# Patient Record
Sex: MALE | Race: WHITE | NOT HISPANIC OR LATINO | Employment: OTHER | URBAN - METROPOLITAN AREA
[De-identification: names, ages, dates, MRNs, and addresses within clinical notes are randomized per-mention and may not be internally consistent; named-entity substitution may affect disease eponyms.]

---

## 2023-03-07 ASSESSMENT — RHEUMATOLOGY NEW PATIENT QUESTIONNAIRE
BLOODY CONSTIPATION: N
VISION LOSS: N
DIFFICULTY SWALLOWING: N
TEMPLE PAIN: N
NECK PAIN: Y
SINONASAL CONGESTION: N
ALLEVIATING_FACTORS: NOT MUCH
SUNLIGHT-INDUCED SKIN RASH: LEGS
DIZZINESS: Y
VERTIGO: Y
RINGING IN EARS: Y
SKIN PLAQUES: Y
NUMBNESS: Y
HEARTBURN: Y
DRY MOUTH: N
ACHILLES TENDON PAIN: N
MORNING STIFFNESS: <30 MINS
GOITER: N
NAUSEA: Y
COLD-INDUCED COLOR CHANGES (WHITE, PURPLE, RED ON REWARMING): FINGERS
HEARING LOSS: Y
CHEST PAIN WITH BREATHING: N
BODY ACHES: Y
COUGH WITH BLOODY PHLEGM: N
RATE_1TO10: 5
MARK ALL THE AREAS OF PAIN: 83231792
GENITAL ULCERS: N
NOSEBLEEDS: Y
EYE PAIN: Y
HEADACHES: Y
BLOODY DIARRHEA: N
FROTHY URINE: N
IRREGULAR BEATS: N
BLURRINESS: Y
NIGHT SWEATS: Y
MUSCLE PAIN: Y
JOINT SWELLING: N
PELVIC PAIN: Y
CHILLS: N
MUSCLE WEAKNESS: Y
DRY COUGH: N
INEFFECTIVE_MEDICATIONS: NAPROXEN
ANXIETY: Y
DRY EYES: Y
SUNLIGHT-INDUCED SKIN RASH: ARMS
FEVERS: N
NASAL ULCERS: N
DEPRESSION: N
TINGLING: Y
JOINT PAIN: BETTER WITH ACTIVITY
SNORING: Y
BLOOD IN URINE: N
BLEEDING GUMS: N
VOMITING: Y
BURNING URINATION: N
PALPITATIONS: N
SINUS PAIN: N
SUNLIGHT-INDUCED SKIN RASH: FACE
EAR PAIN: N
EYE REDNESS: Y
ABDOMINAL PAIN: Y
SORE THROAT: N
ABNORMAL DISCHARGE: N
ORAL ULCERS: N
CAVITIES: N
DIFFICULTY SPEAKING: Y
SHORTNESS OF BREATH: N
SWOLLEN GLANDS: AROUND NECK
THYROID PAIN: N
SPASMS: Y

## 2023-03-10 ENCOUNTER — OFFICE VISIT (OUTPATIENT)
Dept: RHEUMATOLOGY | Facility: MEDICAL CENTER | Age: 63
End: 2023-03-10
Attending: STUDENT IN AN ORGANIZED HEALTH CARE EDUCATION/TRAINING PROGRAM
Payer: COMMERCIAL

## 2023-03-10 ENCOUNTER — HOSPITAL ENCOUNTER (OUTPATIENT)
Dept: RADIOLOGY | Facility: MEDICAL CENTER | Age: 63
End: 2023-03-10
Attending: STUDENT IN AN ORGANIZED HEALTH CARE EDUCATION/TRAINING PROGRAM
Payer: COMMERCIAL

## 2023-03-10 ENCOUNTER — HOSPITAL ENCOUNTER (OUTPATIENT)
Dept: LAB | Facility: MEDICAL CENTER | Age: 63
End: 2023-03-10
Attending: STUDENT IN AN ORGANIZED HEALTH CARE EDUCATION/TRAINING PROGRAM
Payer: COMMERCIAL

## 2023-03-10 VITALS
HEART RATE: 81 BPM | BODY MASS INDEX: 30.7 KG/M2 | WEIGHT: 207.25 LBS | DIASTOLIC BLOOD PRESSURE: 84 MMHG | TEMPERATURE: 99.6 F | SYSTOLIC BLOOD PRESSURE: 158 MMHG | HEIGHT: 69 IN | OXYGEN SATURATION: 97 %

## 2023-03-10 DIAGNOSIS — E83.52 HYPERCALCEMIA DUE TO SARCOIDOSIS: ICD-10-CM

## 2023-03-10 DIAGNOSIS — D86.9 HYPERCALCEMIA DUE TO SARCOIDOSIS: ICD-10-CM

## 2023-03-10 DIAGNOSIS — M25.50 ARTHRALGIA OF MULTIPLE JOINTS: ICD-10-CM

## 2023-03-10 DIAGNOSIS — R76.8 SEROLOGIC AUTOIMMUNITY: ICD-10-CM

## 2023-03-10 DIAGNOSIS — D86.9 SARCOIDOSIS, UNSPECIFIED: ICD-10-CM

## 2023-03-10 DIAGNOSIS — R76.8 LOW SERUM IGG FOR AGE: ICD-10-CM

## 2023-03-10 LAB
C3 SERPL-MCNC: 125.1 MG/DL (ref 87–200)
C4 SERPL-MCNC: 22.2 MG/DL (ref 19–52)

## 2023-03-10 PROCEDURE — 36415 COLL VENOUS BLD VENIPUNCTURE: CPT

## 2023-03-10 PROCEDURE — 86334 IMMUNOFIX E-PHORESIS SERUM: CPT

## 2023-03-10 PROCEDURE — 99212 OFFICE O/P EST SF 10 MIN: CPT | Performed by: STUDENT IN AN ORGANIZED HEALTH CARE EDUCATION/TRAINING PROGRAM

## 2023-03-10 PROCEDURE — 77077 JOINT SURVEY SINGLE VIEW: CPT

## 2023-03-10 PROCEDURE — 82784 ASSAY IGA/IGD/IGG/IGM EACH: CPT

## 2023-03-10 PROCEDURE — 82652 VIT D 1 25-DIHYDROXY: CPT

## 2023-03-10 PROCEDURE — 84165 PROTEIN E-PHORESIS SERUM: CPT

## 2023-03-10 PROCEDURE — 99417 PROLNG OP E/M EACH 15 MIN: CPT | Performed by: STUDENT IN AN ORGANIZED HEALTH CARE EDUCATION/TRAINING PROGRAM

## 2023-03-10 PROCEDURE — 86038 ANTINUCLEAR ANTIBODIES: CPT

## 2023-03-10 PROCEDURE — 84155 ASSAY OF PROTEIN SERUM: CPT

## 2023-03-10 PROCEDURE — 83516 IMMUNOASSAY NONANTIBODY: CPT | Mod: 91

## 2023-03-10 PROCEDURE — 99205 OFFICE O/P NEW HI 60 MIN: CPT | Performed by: STUDENT IN AN ORGANIZED HEALTH CARE EDUCATION/TRAINING PROGRAM

## 2023-03-10 PROCEDURE — 73070 X-RAY EXAM OF ELBOW: CPT | Mod: LT

## 2023-03-10 PROCEDURE — 86160 COMPLEMENT ANTIGEN: CPT

## 2023-03-10 PROCEDURE — 73565 X-RAY EXAM OF KNEES: CPT

## 2023-03-10 RX ORDER — ATORVASTATIN CALCIUM 80 MG/1
TABLET, FILM COATED ORAL
COMMUNITY
Start: 2012-01-06 | End: 2024-03-12

## 2023-03-10 RX ORDER — PREDNISONE 10 MG/1
TABLET ORAL
Qty: 35 TABLET | Refills: 0 | Status: SHIPPED | OUTPATIENT
Start: 2023-03-10 | End: 2023-04-07

## 2023-03-10 RX ORDER — BETAMETHASONE DIPROPIONATE 0.5 MG/G
CREAM TOPICAL
COMMUNITY
Start: 2021-03-06

## 2023-03-10 RX ORDER — NAPROXEN 500 MG/1
500 TABLET ORAL 2 TIMES DAILY WITH MEALS
COMMUNITY
Start: 2023-02-08 | End: 2024-03-12

## 2023-03-10 RX ORDER — QUETIAPINE FUMARATE 50 MG/1
TABLET, FILM COATED ORAL
COMMUNITY
Start: 2012-01-06

## 2023-03-10 RX ORDER — LOSARTAN POTASSIUM AND HYDROCHLOROTHIAZIDE 12.5; 5 MG/1; MG/1
TABLET ORAL
COMMUNITY
Start: 2023-02-06

## 2023-03-10 RX ORDER — AMITRIPTYLINE HYDROCHLORIDE 25 MG/1
TABLET, FILM COATED ORAL
COMMUNITY
Start: 2023-03-06 | End: 2023-03-20

## 2023-03-10 NOTE — PATIENT INSTRUCTIONS
AFTER VISIT INSTRUCTIONS    Below are important information to help you navigate your healthcare needs and help us serve you safely and effectively:  If laboratory tests and/or imaging studies were ordered, remember to go get them done as instructed.  If new prescriptions and/or refills were sent, remember to go pick them up from your local pharmacy, or call the specialty pharmacy to request shipment.  Always take your prescription medications exactly as prescribed unless instructed otherwise.  Note that antirheumatic drugs and steroids are immunosuppressive which means they increase your risk of infections and have multiple potential adverse effects on various organ systems in your body, though most of them are uncommon.  It is important that you are up-to-date on age-appropriate immunizations, particularly shingles and bacterial/viral pneumonia vaccines, which you can request from me or your primary care provider.  Be sure to read the drug package inserts to learn about the potential side effects of your medications before you start taking them.  If you experience any significant drug side effects, stop taking the medication and notify me promptly, and depending on the severity of the side effects, consider going to an urgent care or emergency department for immediate attention.  If there are significant findings on your lab tests and imaging studies that warrant further action, I will notify you with explanations via Continuumhart or phone call, otherwise you can view them on Dropifi and let me know if you have any questions.  Note that Dropifi messages are typically read during office hours and may take 1-7 business days before a response depending on the urgency of the situation and how busy my clinic schedule is.  In general, Dropifi messaging is for non-urgent matters that do not require immediate attention, so for urgent matters that cannot wait, you are advised to go to an urgent care.  Lastly, you are granted  MyChart access to my documentation of your visit and are encouraged to read my note which details my assessment and plan for your condition.
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
## 2023-03-11 PROBLEM — R76.8 SEROLOGIC AUTOIMMUNITY: Status: ACTIVE | Noted: 2023-03-11

## 2023-03-11 PROBLEM — M25.50 ARTHRALGIA OF MULTIPLE JOINTS: Status: ACTIVE | Noted: 2023-03-11

## 2023-03-11 PROBLEM — D86.9 HYPERCALCEMIA DUE TO SARCOIDOSIS: Status: ACTIVE | Noted: 2023-03-11

## 2023-03-11 PROBLEM — E83.52 HYPERCALCEMIA DUE TO SARCOIDOSIS: Status: ACTIVE | Noted: 2023-03-11

## 2023-03-11 PROBLEM — D86.9 SARCOIDOSIS, UNSPECIFIED: Status: ACTIVE | Noted: 2023-03-11

## 2023-03-12 LAB — NUCLEAR IGG SER QL IA: NORMAL

## 2023-03-13 LAB
1,25(OH)2D3 SERPL-MCNC: 50.6 PG/ML (ref 19.9–79.3)
CHROMATIN IGG SERPL-ACNC: 12 UNITS (ref 0–19)
HISTONE IGG SER IA-ACNC: 1.8 UNITS (ref 0–0.9)

## 2023-03-15 LAB
ALBUMIN SERPL ELPH-MCNC: 4.71 G/DL (ref 3.75–5.01)
ALPHA1 GLOB SERPL ELPH-MCNC: 0.28 G/DL (ref 0.19–0.46)
ALPHA2 GLOB SERPL ELPH-MCNC: 0.84 G/DL (ref 0.48–1.05)
B-GLOBULIN SERPL ELPH-MCNC: 0.73 G/DL (ref 0.48–1.1)
GAMMA GLOB SERPL ELPH-MCNC: 0.53 G/DL (ref 0.62–1.51)
IGA SERPL-MCNC: 155 MG/DL (ref 68–408)
IGG SERPL-MCNC: 512 MG/DL (ref 768–1632)
IGM SERPL-MCNC: 30 MG/DL (ref 35–263)
INTERPRETATION SERPL IFE-IMP: ABNORMAL
MONOCLON BAND OBS SERPL: ABNORMAL
MONOCLONAL PROTEIN NL11656: ABNORMAL G/DL
PATHOLOGY STUDY: ABNORMAL
PROT SERPL-MCNC: 7.1 G/DL (ref 6.3–8.2)

## 2023-03-20 ENCOUNTER — OFFICE VISIT (OUTPATIENT)
Dept: RHEUMATOLOGY | Facility: MEDICAL CENTER | Age: 63
End: 2023-03-20
Attending: STUDENT IN AN ORGANIZED HEALTH CARE EDUCATION/TRAINING PROGRAM
Payer: COMMERCIAL

## 2023-03-20 VITALS
SYSTOLIC BLOOD PRESSURE: 154 MMHG | WEIGHT: 209 LBS | HEART RATE: 79 BPM | BODY MASS INDEX: 30.96 KG/M2 | TEMPERATURE: 97.4 F | DIASTOLIC BLOOD PRESSURE: 90 MMHG | HEIGHT: 69 IN | OXYGEN SATURATION: 99 %

## 2023-03-20 DIAGNOSIS — D86.89 SARCOIDOSIS OF DIGESTIVE SYSTEM: ICD-10-CM

## 2023-03-20 DIAGNOSIS — M15.3 POST-TRAUMATIC OSTEOARTHRITIS OF MULTIPLE JOINTS: ICD-10-CM

## 2023-03-20 DIAGNOSIS — D84.9 IMMUNOSUPPRESSED STATUS (HCC): ICD-10-CM

## 2023-03-20 PROCEDURE — 99212 OFFICE O/P EST SF 10 MIN: CPT | Performed by: STUDENT IN AN ORGANIZED HEALTH CARE EDUCATION/TRAINING PROGRAM

## 2023-03-20 PROCEDURE — 99214 OFFICE O/P EST MOD 30 MIN: CPT | Performed by: STUDENT IN AN ORGANIZED HEALTH CARE EDUCATION/TRAINING PROGRAM

## 2023-03-20 RX ORDER — ZOSTER VACCINE RECOMBINANT, ADJUVANTED 50 MCG/0.5
KIT INTRAMUSCULAR
Qty: 0.5 ML | Refills: 1 | Status: SHIPPED | OUTPATIENT
Start: 2023-03-20

## 2023-03-20 RX ORDER — FOLIC ACID 1 MG/1
TABLET ORAL
Qty: 72 TABLET | Refills: 3 | Status: SHIPPED | OUTPATIENT
Start: 2023-03-20 | End: 2023-06-01 | Stop reason: DRUGHIGH

## 2023-03-20 RX ORDER — AMITRIPTYLINE HYDROCHLORIDE 25 MG/1
TABLET, FILM COATED ORAL
COMMUNITY

## 2023-03-20 RX ORDER — NAPROXEN 500 MG/1
TABLET ORAL
COMMUNITY
End: 2023-03-20

## 2023-03-20 NOTE — PATIENT INSTRUCTIONS
AFTER VISIT INSTRUCTIONS    Below are important information to help you navigate your healthcare needs and help us serve you safely and effectively:  If laboratory tests and/or imaging studies were ordered, remember to go get them done as instructed.  If new prescriptions and/or refills were sent, remember to go pick them up from your local pharmacy, or call the specialty pharmacy to request shipment.  Always take your prescription medications exactly as prescribed unless instructed otherwise.  Note that antirheumatic drugs and steroids are immunosuppressive which means they increase your risk of infections and have multiple potential adverse effects on various organ systems in your body, though most of them are uncommon.  It is important that you are up-to-date on age-appropriate immunizations, particularly shingles and bacterial/viral pneumonia vaccines, which you can request from me or your primary care provider.  Be sure to read the drug package inserts to learn about the potential side effects of your medications before you start taking them.  If you experience any significant drug side effects, stop taking the medication and notify me promptly, and depending on the severity of the side effects, consider going to an urgent care or emergency department for immediate attention.  If there are significant findings on your lab tests and imaging studies that warrant further action, I will notify you with explanations via Vuv Analyticshart or phone call, otherwise you can view them on Swagapalooza and let me know if you have any questions.  Note that Sw[employer]apalooza messages are typically read during office hours and may take 1-7 business days before a response depending on the urgency of the situation and how busy my clinic schedule is.  In general, Swagapalooza messaging is for non-urgent matters that do not require immediate attention, so for urgent matters that cannot wait, you are advised to go to an urgent care.  Lastly, you are granted  MyChart access to my documentation of your visit and are encouraged to read my note which details my assessment and plan for your condition.
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
## 2023-05-31 ASSESSMENT — RHEUMATOLOGY FOLLOW-UP QUESTIONNAIRE
SUNLIGHT-INDUCED SKIN RASH: ARMS
WEAKNESS: Y
RINGING IN EARS: Y
DRY EYES: Y
CHARACTERISTIC: SAME WITH ACTIVITY
ALLEVIATING_FACTORS: MTX
BLURRY VISION: Y
MARK ALL THE AREAS OF PAIN: 86422616
HELPFUL_MEDICATIONS: MTX
TENDON PAIN: Y
EYE REDNESS: Y
MUSCLE PAIN: Y
SKIN PLAQUES: Y
JOINT SWELLING: Y
NAUSEA: Y
EXACERBATING_FACTORS: SUN
COLD-INDUCED COLOR CHANGES (WHITE, PURPLE, RED ON REWARMING): TOES
DURATION: 30-60 MINS
SUNLIGHT-INDUCED SKIN RASH: FACE
JOINT PAIN: SAME WITH ACTIVITY
LYMPH NODE SWELLING: NECK
ABDOMINAL PAIN: Y
CHIEF_COMPLAINT: FOLLOW UP

## 2023-06-01 ENCOUNTER — OFFICE VISIT (OUTPATIENT)
Dept: RHEUMATOLOGY | Facility: MEDICAL CENTER | Age: 63
End: 2023-06-01
Attending: STUDENT IN AN ORGANIZED HEALTH CARE EDUCATION/TRAINING PROGRAM
Payer: COMMERCIAL

## 2023-06-01 VITALS
SYSTOLIC BLOOD PRESSURE: 132 MMHG | HEART RATE: 69 BPM | TEMPERATURE: 97.7 F | OXYGEN SATURATION: 98 % | HEIGHT: 69 IN | WEIGHT: 201 LBS | RESPIRATION RATE: 18 BRPM | BODY MASS INDEX: 29.77 KG/M2 | DIASTOLIC BLOOD PRESSURE: 82 MMHG

## 2023-06-01 DIAGNOSIS — M15.3 POST-TRAUMATIC OSTEOARTHRITIS OF MULTIPLE JOINTS: ICD-10-CM

## 2023-06-01 DIAGNOSIS — D84.9 IMMUNOSUPPRESSED STATUS (HCC): ICD-10-CM

## 2023-06-01 DIAGNOSIS — D86.9 SARCOIDOSIS, UNSPECIFIED: ICD-10-CM

## 2023-06-01 PROCEDURE — 3079F DIAST BP 80-89 MM HG: CPT | Performed by: STUDENT IN AN ORGANIZED HEALTH CARE EDUCATION/TRAINING PROGRAM

## 2023-06-01 PROCEDURE — 99212 OFFICE O/P EST SF 10 MIN: CPT | Performed by: STUDENT IN AN ORGANIZED HEALTH CARE EDUCATION/TRAINING PROGRAM

## 2023-06-01 PROCEDURE — 99214 OFFICE O/P EST MOD 30 MIN: CPT | Performed by: STUDENT IN AN ORGANIZED HEALTH CARE EDUCATION/TRAINING PROGRAM

## 2023-06-01 PROCEDURE — 3075F SYST BP GE 130 - 139MM HG: CPT | Performed by: STUDENT IN AN ORGANIZED HEALTH CARE EDUCATION/TRAINING PROGRAM

## 2023-06-01 RX ORDER — MELOXICAM 7.5 MG/1
7.5 TABLET ORAL
Qty: 60 TABLET | Refills: 5 | Status: SHIPPED | OUTPATIENT
Start: 2023-06-01 | End: 2023-07-01

## 2023-06-01 RX ORDER — FOLIC ACID 1 MG/1
2 TABLET ORAL DAILY
Qty: 180 TABLET | Refills: 3 | Status: SHIPPED | OUTPATIENT
Start: 2023-06-01 | End: 2023-12-26 | Stop reason: DRUGHIGH

## 2023-06-01 ASSESSMENT — PATIENT HEALTH QUESTIONNAIRE - PHQ9: CLINICAL INTERPRETATION OF PHQ2 SCORE: 0
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
## 2023-06-01 NOTE — PATIENT INSTRUCTIONS
AFTER VISIT INSTRUCTIONS    Below are important information to help you navigate your healthcare needs and help us serve you safely and effectively:  If laboratory tests and/or imaging studies were ordered, remember to go get them done as instructed.  If new prescriptions and/or refills were sent, remember to go pick them up from your local pharmacy, or call the specialty pharmacy to request shipment.  Always take your prescription medications exactly as prescribed unless instructed otherwise.  Note that antirheumatic drugs and steroids are immunosuppressive which means they increase your risk of infections and have multiple potential adverse effects on various organ systems in your body, though most of them are uncommon.  It is important that you are up-to-date on age-appropriate immunizations, particularly shingles and bacterial/viral pneumonia vaccines, which you can request from me or your primary care provider.  Be sure to read the drug package inserts to learn about the potential side effects of your medications before you start taking them.  If you experience any significant drug side effects, stop taking the medication and notify me promptly, and depending on the severity of the side effects, consider going to an urgent care or emergency department for immediate attention.  If there are significant findings on your lab tests and imaging studies that warrant further action, I will notify you with explanations via [employer] or phone call, otherwise you can view them on [employer] and let me know if you have any questions.  Note that [employer] messages are typically read during office hours and may take 1-7 business days before a response depending on the urgency of the situation and how busy my clinic schedule is.  In general, [employer] messaging is for non-urgent matters that do not require immediate attention, so for urgent matters that cannot wait, you are advised to go to an urgent care.  Lastly, you are granted  MyChart access to my documentation of your visit and are encouraged to read my note which details my assessment and plan for your condition.

## 2023-12-11 ASSESSMENT — RHEUMATOLOGY FOLLOW-UP QUESTIONNAIRE
SUNLIGHT-INDUCED SKIN RASH: NECK
ORAL ULCERS: Y
VERTIGO: Y
DURATION: 30-60 MINS
UNINTENTIONAL WEIGHT LOSS: <10 LBS
JOINT PAIN: WORSE WITH ACTIVITY
DRY EYES: Y
SPASMS: Y
DEPRESSION: Y
DRY COUGH: Y
HEARTBURN: Y
HELPFUL_MEDICATIONS: MTX
HEADACHES: Y
NAUSEA: Y
EYE PAIN: Y
COLD-INDUCED COLOR CHANGES (WHITE, PURPLE, RED ON REWARMING): FINGERS
CHEST PAIN WITH BREATHING: Y
ABDOMINAL PAIN: Y
EYE REDNESS: Y
MARK ALL THE AREAS OF PAIN: 94388146
SUNLIGHT-INDUCED SKIN RASH: ARMS
DIZZINESS: Y
SUNLIGHT-INDUCED SKIN RASH: LEGS
SUNLIGHT-INDUCED SKIN RASH: FACE
CHARACTERISTIC: SAME WITH ACTIVITY
VOMITING: Y
RINGING IN EARS: Y
ANXIETY: Y
LYMPH NODE SWELLING: NECK
BLURRY VISION: Y
COLD-INDUCED COLOR CHANGES (WHITE, PURPLE, RED ON REWARMING): TOES
NOSEBLEEDS: Y
WEAKNESS: Y

## 2023-12-12 ENCOUNTER — OFFICE VISIT (OUTPATIENT)
Dept: RHEUMATOLOGY | Facility: MEDICAL CENTER | Age: 63
End: 2023-12-12
Attending: STUDENT IN AN ORGANIZED HEALTH CARE EDUCATION/TRAINING PROGRAM
Payer: COMMERCIAL

## 2023-12-12 VITALS
WEIGHT: 202 LBS | TEMPERATURE: 98 F | HEART RATE: 89 BPM | OXYGEN SATURATION: 98 % | SYSTOLIC BLOOD PRESSURE: 146 MMHG | HEIGHT: 68 IN | DIASTOLIC BLOOD PRESSURE: 90 MMHG | BODY MASS INDEX: 30.62 KG/M2

## 2023-12-12 DIAGNOSIS — M15.3 POST-TRAUMATIC OSTEOARTHRITIS OF MULTIPLE JOINTS: ICD-10-CM

## 2023-12-12 DIAGNOSIS — D84.9 IMMUNOSUPPRESSED STATUS (HCC): ICD-10-CM

## 2023-12-12 DIAGNOSIS — D86.9 SARCOIDOSIS, UNSPECIFIED: ICD-10-CM

## 2023-12-12 PROCEDURE — 99214 OFFICE O/P EST MOD 30 MIN: CPT | Performed by: STUDENT IN AN ORGANIZED HEALTH CARE EDUCATION/TRAINING PROGRAM

## 2023-12-12 PROCEDURE — 99212 OFFICE O/P EST SF 10 MIN: CPT | Performed by: STUDENT IN AN ORGANIZED HEALTH CARE EDUCATION/TRAINING PROGRAM

## 2023-12-12 PROCEDURE — 3080F DIAST BP >= 90 MM HG: CPT | Performed by: STUDENT IN AN ORGANIZED HEALTH CARE EDUCATION/TRAINING PROGRAM

## 2023-12-12 PROCEDURE — 3077F SYST BP >= 140 MM HG: CPT | Performed by: STUDENT IN AN ORGANIZED HEALTH CARE EDUCATION/TRAINING PROGRAM

## 2023-12-12 RX ORDER — LEFLUNOMIDE 20 MG/1
20 TABLET ORAL DAILY
Qty: 30 TABLET | Refills: 2 | Status: SHIPPED | OUTPATIENT
Start: 2023-12-12 | End: 2024-03-12 | Stop reason: SINTOL

## 2023-12-12 RX ORDER — CELECOXIB 100 MG/1
100 CAPSULE ORAL
Qty: 60 CAPSULE | Refills: 2 | Status: SHIPPED | OUTPATIENT
Start: 2023-12-12

## 2023-12-12 NOTE — PATIENT INSTRUCTIONS
AFTER VISIT INSTRUCTIONS    Below are important information to help you navigate your healthcare needs and help us serve you safely and effectively:  If laboratory tests and/or imaging studies were ordered, remember to go get them done as instructed.  If new prescriptions and/or refills were sent, remember to go pick them up from your local pharmacy, or call the specialty pharmacy to request shipment.  Always take your prescription medications exactly as prescribed unless instructed otherwise.  Note that antirheumatic drugs and steroids are immunosuppressive which means they increase your risk of infections and have multiple potential adverse effects on various organ systems in your body, though most of them are uncommon.  It is important that you are up-to-date on age-appropriate immunizations, particularly shingles and bacterial/viral pneumonia vaccines, which you can request from me or your primary care provider.  Be sure to read the drug package inserts to learn about the potential side effects of your medications before you start taking them.  If you experience any significant drug side effects, stop taking the medication and notify me promptly, and depending on the severity of the side effects, consider going to an urgent care or emergency department for immediate attention.  If there are significant findings on your lab tests and imaging studies that warrant further action, I will notify you with explanations via [employer] or phone call, otherwise you can view them on [employer] and let me know if you have any questions.  Note that [employer] messages are typically read during office hours and may take 1-7 business days before a response depending on the urgency of the situation and how busy my clinic schedule is.  In general, [employer] messaging is for non-urgent matters that do not require immediate attention, so for urgent matters that cannot wait, you are advised to go to an urgent care.  Lastly, you are granted  [employer] access to my documentation of your visit and are encouraged to read my note which details my assessment and plan for your condition.  To learn more about your condition and rheumatic diseases evaluated and treated by rheumatologists, as well as gain access to many helpful resources about these diseases, visit our website at [email or web address].
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
## 2023-12-20 ENCOUNTER — TELEPHONE (OUTPATIENT)
Dept: RHEUMATOLOGY | Facility: MEDICAL CENTER | Age: 63
End: 2023-12-20
Payer: COMMERCIAL

## 2023-12-20 NOTE — TELEPHONE ENCOUNTER
Pt called and stated he started the leflunomide on Sat and it is making him very sick. He would like to know if there is something else you can prescribe or what he should do. Please advise. Thank you.

## 2023-12-21 ENCOUNTER — PATIENT MESSAGE (OUTPATIENT)
Dept: RHEUMATOLOGY | Facility: MEDICAL CENTER | Age: 63
End: 2023-12-21
Payer: COMMERCIAL

## 2023-12-21 DIAGNOSIS — D86.9 SARCOIDOSIS, UNSPECIFIED: ICD-10-CM

## 2023-12-21 DIAGNOSIS — D84.9 IMMUNOSUPPRESSED STATUS (HCC): ICD-10-CM

## 2023-12-26 RX ORDER — METHOTREXATE 2.5 MG/1
10 TABLET ORAL
Qty: 52 TABLET | Refills: 3 | Status: SHIPPED | OUTPATIENT
Start: 2023-12-26 | End: 2024-03-12 | Stop reason: SINTOL

## 2023-12-26 RX ORDER — FOLIC ACID 1 MG/1
3 TABLET ORAL DAILY
Qty: 270 TABLET | Refills: 3 | Status: SHIPPED | OUTPATIENT
Start: 2023-12-26

## 2024-03-09 ASSESSMENT — RHEUMATOLOGY FOLLOW-UP QUESTIONNAIRE
WEAKNESS: Y
VOMITING: Y
BODY ACHES: Y
SUNLIGHT-INDUCED SKIN RASH: LEGS
DIZZINESS: Y
SUNLIGHT-INDUCED SKIN RASH: ARMS
UNINTENTIONAL WEIGHT LOSS: <10 LBS
TENDON PAIN: Y
FATIGUE: Y
LYMPH NODE SWELLING: NECK
DRY MOUTH: Y
SHORTNESS OF BREATH: Y
VERTIGO: Y
SKIN PLAQUES: Y
NAUSEA: Y
CHARACTERISTIC: BETTER WITH ACTIVITY
JOINT SWELLING: Y
ABDOMINAL PAIN: Y
COLD-INDUCED COLOR CHANGES (WHITE, PURPLE, RED ON REWARMING): FINGERS
SUNLIGHT-INDUCED SKIN RASH: NECK
JOINT PAIN: WORSE WITH ACTIVITY
HAIR LOSS WITH BALD SPOTS: Y
CHILLS: Y
HEADACHES: Y
DURATION: 30-60 MINS
PRECEDING INCIDENT OR AILMENT: SARCOIDOSIS
ORAL ULCERS: Y
MUSCLE PAIN: Y
SUNLIGHT-INDUCED SKIN RASH: CHEST
BLURRY VISION: Y
EYE REDNESS: Y
ALLEVIATING_FACTORS: MTX
DRY EYES: Y
RINGING IN EARS: Y
NOSEBLEEDS: Y
MALAISE: Y
TINGLING: Y
SPASMS: Y
SUNLIGHT-INDUCED SKIN RASH: FACE
RATE_1TO10: 5

## 2024-03-12 ENCOUNTER — OFFICE VISIT (OUTPATIENT)
Dept: RHEUMATOLOGY | Facility: MEDICAL CENTER | Age: 64
End: 2024-03-12
Attending: STUDENT IN AN ORGANIZED HEALTH CARE EDUCATION/TRAINING PROGRAM
Payer: COMMERCIAL

## 2024-03-12 VITALS
SYSTOLIC BLOOD PRESSURE: 144 MMHG | DIASTOLIC BLOOD PRESSURE: 78 MMHG | WEIGHT: 199 LBS | OXYGEN SATURATION: 99 % | TEMPERATURE: 98 F | HEIGHT: 68 IN | HEART RATE: 78 BPM | BODY MASS INDEX: 30.16 KG/M2

## 2024-03-12 DIAGNOSIS — D86.9 SARCOIDOSIS, UNSPECIFIED: ICD-10-CM

## 2024-03-12 DIAGNOSIS — M15.3 POST-TRAUMATIC OSTEOARTHRITIS OF MULTIPLE JOINTS: ICD-10-CM

## 2024-03-12 DIAGNOSIS — D84.9 IMMUNOSUPPRESSED STATUS (HCC): ICD-10-CM

## 2024-03-12 PROCEDURE — 3077F SYST BP >= 140 MM HG: CPT | Performed by: STUDENT IN AN ORGANIZED HEALTH CARE EDUCATION/TRAINING PROGRAM

## 2024-03-12 PROCEDURE — 99212 OFFICE O/P EST SF 10 MIN: CPT | Performed by: STUDENT IN AN ORGANIZED HEALTH CARE EDUCATION/TRAINING PROGRAM

## 2024-03-12 PROCEDURE — 99214 OFFICE O/P EST MOD 30 MIN: CPT | Performed by: STUDENT IN AN ORGANIZED HEALTH CARE EDUCATION/TRAINING PROGRAM

## 2024-03-12 PROCEDURE — 3078F DIAST BP <80 MM HG: CPT | Performed by: STUDENT IN AN ORGANIZED HEALTH CARE EDUCATION/TRAINING PROGRAM
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
## 2024-03-12 NOTE — PATIENT INSTRUCTIONS
AFTER VISIT INSTRUCTIONS    Below are important information to help you navigate your healthcare needs and help us serve you safely and effectively:  If laboratory tests and/or imaging studies were ordered, remember to go get them done as instructed.  If new prescriptions and/or refills were sent, remember to go pick them up from your local pharmacy, or call the specialty pharmacy to request shipment.  Always take your prescription medications exactly as prescribed unless instructed otherwise.  Note that antirheumatic drugs and steroids are immunosuppressive which means they increase your risk of infections and have multiple potential adverse effects on various organ systems in your body, though many of them are uncommon.  It is important that you are up-to-date on age-appropriate immunizations, particularly shingles and bacterial/viral pneumonia vaccines, which you can request from me or your primary care provider.  Be sure to read the drug package inserts to learn about the potential side effects of your medications before you start taking them.  If you experience any significant drug side effects, stop taking the medication and notify me promptly, and depending on the severity of the side effects, consider going to an urgent care or emergency department for immediate attention.  If there are significant findings on your lab tests and imaging studies that warrant further action, I will notify you with explanations via [employer] or phone call, otherwise you can view them on [employer] and let me know if you have any questions.  Note that [employer] messages are typically read during office hours and may take 1-7 business days before a response depending on the urgency of the situation and how busy my clinic schedule is.  In general, [employer] messaging is for non-urgent matters that do not require immediate attention, so for urgent matters that cannot wait, you are advised to go to an urgent care.  You are granted [employer]  access to my documentation of your visit and are encouraged to read my note which details my assessment and plan for your condition.  To learn more about your condition and rheumatic diseases evaluated and treated by rheumatologists, as well as gain access to many helpful resources about these diseases, visit our website: [email or web address].  To properly dispose of your unused, unwanted, or residual medications/supplies, visit the Drug Enforcement Administration website to locate your closest drop-off location: www.artem.gov/everyday-takeback-day.

## 2024-03-21 ENCOUNTER — PATIENT MESSAGE (OUTPATIENT)
Dept: RHEUMATOLOGY | Facility: MEDICAL CENTER | Age: 64
End: 2024-03-21
Payer: COMMERCIAL

## 2024-03-21 DIAGNOSIS — D86.9 SARCOIDOSIS, UNSPECIFIED: ICD-10-CM

## 2024-03-22 RX ORDER — AZATHIOPRINE 50 MG/1
50 TABLET ORAL DAILY
Qty: 30 TABLET | Refills: 5 | Status: SHIPPED | OUTPATIENT
Start: 2024-03-22

## 2024-05-07 DIAGNOSIS — M15.3 POST-TRAUMATIC OSTEOARTHRITIS OF MULTIPLE JOINTS: ICD-10-CM

## 2024-05-09 RX ORDER — CELECOXIB 100 MG/1
100 CAPSULE ORAL
Qty: 60 CAPSULE | Refills: 5 | Status: SHIPPED | OUTPATIENT
Start: 2024-05-09

## 2024-05-23 ENCOUNTER — OFFICE VISIT (OUTPATIENT)
Dept: RHEUMATOLOGY | Facility: MEDICAL CENTER | Age: 64
End: 2024-05-23
Attending: STUDENT IN AN ORGANIZED HEALTH CARE EDUCATION/TRAINING PROGRAM
Payer: COMMERCIAL

## 2024-05-23 VITALS
SYSTOLIC BLOOD PRESSURE: 140 MMHG | DIASTOLIC BLOOD PRESSURE: 76 MMHG | TEMPERATURE: 97.1 F | HEIGHT: 68 IN | HEART RATE: 76 BPM | OXYGEN SATURATION: 98 % | WEIGHT: 201.5 LBS | BODY MASS INDEX: 30.54 KG/M2

## 2024-05-23 DIAGNOSIS — D84.9 IMMUNOSUPPRESSED STATUS (HCC): ICD-10-CM

## 2024-05-23 DIAGNOSIS — D86.9 SARCOIDOSIS, UNSPECIFIED: ICD-10-CM

## 2024-05-23 DIAGNOSIS — R21 RASH AND NONSPECIFIC SKIN ERUPTION: ICD-10-CM

## 2024-05-23 DIAGNOSIS — M15.3 POST-TRAUMATIC OSTEOARTHRITIS OF MULTIPLE JOINTS: ICD-10-CM

## 2024-05-23 PROCEDURE — 3078F DIAST BP <80 MM HG: CPT | Performed by: STUDENT IN AN ORGANIZED HEALTH CARE EDUCATION/TRAINING PROGRAM

## 2024-05-23 PROCEDURE — 99214 OFFICE O/P EST MOD 30 MIN: CPT | Performed by: STUDENT IN AN ORGANIZED HEALTH CARE EDUCATION/TRAINING PROGRAM

## 2024-05-23 PROCEDURE — 3077F SYST BP >= 140 MM HG: CPT | Performed by: STUDENT IN AN ORGANIZED HEALTH CARE EDUCATION/TRAINING PROGRAM

## 2024-05-23 ASSESSMENT — PATIENT HEALTH QUESTIONNAIRE - PHQ9: CLINICAL INTERPRETATION OF PHQ2 SCORE: 0
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
## 2024-05-23 NOTE — PATIENT INSTRUCTIONS
AFTER VISIT INSTRUCTIONS    Below are important information to help you navigate your healthcare needs and help us serve you safely and effectively:  If laboratory tests and/or imaging studies were ordered, remember to go get them done as instructed.  If new prescriptions and/or refills were sent, remember to go pick them up from your local pharmacy, or call the specialty pharmacy to request shipment.  Always take your prescription medications exactly as prescribed unless instructed otherwise.  Note that antirheumatic drugs and steroids are immunosuppressive which means they increase your risk of infections and have multiple potential adverse effects on various organ systems in your body, though many of them are uncommon.  It is important that you are up-to-date on age-appropriate immunizations, particularly shingles and bacterial/viral pneumonia vaccines, which you can request from me or your primary care provider.  Be sure to read the drug package inserts to learn about the potential side effects of your medications before you start taking them.  If you experience any significant drug side effects, stop taking the medication and notify me promptly, and depending on the severity of the side effects, consider going to an urgent care or emergency department for immediate attention.  If there are significant findings on your lab tests and imaging studies that warrant further action, I will notify you with explanations via [employer] or phone call, otherwise you can view them on [employer] and let me know if you have any questions.  Note that [employer] messages are typically read during office hours and may take 1-7 business days before a response depending on the urgency of the situation and how busy my clinic schedule is.  In general, [employer] messaging is for non-urgent matters that do not require immediate attention, so for urgent matters that cannot wait, you are advised to go to an urgent care.  You are granted [employer]  access to my documentation of your visit and are encouraged to read my note which details my assessment and plan for your condition.  To learn more about your condition and rheumatic diseases evaluated and treated by rheumatologists, as well as gain access to many helpful resources about these diseases, visit our website: [email or web address].  To properly dispose of your unused, unwanted, or residual medications/supplies, visit the Drug Enforcement Administration website to locate your closest drop-off location: www.artem.gov/everyday-takeback-day.

## 2024-07-07 ENCOUNTER — PATIENT MESSAGE (OUTPATIENT)
Dept: RHEUMATOLOGY | Facility: MEDICAL CENTER | Age: 64
End: 2024-07-07
Payer: COMMERCIAL

## 2024-07-07 DIAGNOSIS — D86.9 SARCOIDOSIS, UNSPECIFIED: ICD-10-CM

## 2024-07-12 RX ORDER — AZATHIOPRINE 50 MG/1
75 TABLET ORAL DAILY
Qty: 135 TABLET | Refills: 3 | Status: SHIPPED | OUTPATIENT
Start: 2024-07-12

## 2024-09-23 ENCOUNTER — PHARMACY VISIT (OUTPATIENT)
Dept: PHARMACY | Facility: MEDICAL CENTER | Age: 64
End: 2024-09-23
Payer: COMMERCIAL

## 2024-09-23 ENCOUNTER — OFFICE VISIT (OUTPATIENT)
Dept: RHEUMATOLOGY | Facility: MEDICAL CENTER | Age: 64
End: 2024-09-23
Attending: STUDENT IN AN ORGANIZED HEALTH CARE EDUCATION/TRAINING PROGRAM
Payer: COMMERCIAL

## 2024-09-23 VITALS
BODY MASS INDEX: 30.5 KG/M2 | OXYGEN SATURATION: 97 % | HEIGHT: 68 IN | HEART RATE: 76 BPM | WEIGHT: 201.25 LBS | SYSTOLIC BLOOD PRESSURE: 122 MMHG | TEMPERATURE: 97.1 F | DIASTOLIC BLOOD PRESSURE: 70 MMHG

## 2024-09-23 DIAGNOSIS — E55.9 VITAMIN D INSUFFICIENCY: ICD-10-CM

## 2024-09-23 DIAGNOSIS — M15.3 POST-TRAUMATIC OSTEOARTHRITIS OF MULTIPLE JOINTS: ICD-10-CM

## 2024-09-23 DIAGNOSIS — D84.9 IMMUNOSUPPRESSED STATUS (HCC): ICD-10-CM

## 2024-09-23 DIAGNOSIS — L29.8 PRURITIC ERYTHEMATOUS RASH: ICD-10-CM

## 2024-09-23 DIAGNOSIS — D86.9 SARCOIDOSIS, UNSPECIFIED: ICD-10-CM

## 2024-09-23 PROBLEM — L29.89 PRURITIC ERYTHEMATOUS RASH: Status: ACTIVE | Noted: 2024-05-23

## 2024-09-23 PROCEDURE — 3078F DIAST BP <80 MM HG: CPT | Performed by: STUDENT IN AN ORGANIZED HEALTH CARE EDUCATION/TRAINING PROGRAM

## 2024-09-23 PROCEDURE — 99212 OFFICE O/P EST SF 10 MIN: CPT | Performed by: STUDENT IN AN ORGANIZED HEALTH CARE EDUCATION/TRAINING PROGRAM

## 2024-09-23 PROCEDURE — RXMED WILLOW AMBULATORY MEDICATION CHARGE: Performed by: STUDENT IN AN ORGANIZED HEALTH CARE EDUCATION/TRAINING PROGRAM

## 2024-09-23 PROCEDURE — 3074F SYST BP LT 130 MM HG: CPT | Performed by: STUDENT IN AN ORGANIZED HEALTH CARE EDUCATION/TRAINING PROGRAM

## 2024-09-23 PROCEDURE — 99214 OFFICE O/P EST MOD 30 MIN: CPT | Performed by: STUDENT IN AN ORGANIZED HEALTH CARE EDUCATION/TRAINING PROGRAM

## 2024-09-23 RX ORDER — ZOSTER VACCINE RECOMBINANT, ADJUVANTED 50 MCG/0.5
KIT INTRAMUSCULAR
Qty: 0.5 ML | Refills: 1 | Status: SHIPPED | OUTPATIENT
Start: 2024-09-23

## 2024-09-23 RX ORDER — AZATHIOPRINE 50 MG/1
100 TABLET ORAL DAILY
Qty: 180 TABLET | Refills: 3 | Status: SHIPPED | OUTPATIENT
Start: 2024-09-23

## 2024-09-23 NOTE — PATIENT INSTRUCTIONS
[facility] AFTER VISIT GUIDE    Below are important guidelines to help you navigate your health care needs and assist us in caring for you safely and effectively. We encourage you to carefully read and understand this information and adhere to them accordingly.    [employer] Messaging and Phone Calls:  Diagnosis and Treatment - For a detailed explanation of your condition and treatment plan from today's visit, refer to the visit note on [employer] via the following steps:  Log in to [employer] and click on “Visits” at the top.  Scroll down to “Past Visits” under Appointments.  Click on “View Notes” under the appropriate visit date.  Questions or Concerns - MyChart messaging is for non-urgent matters that do not require immediate attention and should be brief with no more than two questions or concerns. If you have multiple questions or concerns, we ask that you schedule an appointment to have them properly addressed.  Response to Messages - [employer] messages are addressed throughout the week depending on clinical availability, so we ask that you allow up to one week for a response.  Phone Calls and Voicemails - Phone calls and voicemail messages are reserved for time-sensitive matters that cannot wait to be addressed via [employer]. We ask that you refrain from calling the office multiple times or leaving multiple voicemails regarding the same issue as doing so may lead to delays in response time.  Urgent Issues - For urgent medical matters or medical emergencies that cannot wait, you are advised to go to your nearest Urgent Care or Emergency Department for immediate attention.    Laboratory Tests and Imaging Studies:  Future Lab and Imaging Orders - We ask that you get your lab tests and imaging studies done no later than one week before your follow-up visit unless instructed otherwise.  Results Communication - You may see some test results marked as “abnormal” that are not necessarily significant or concerning. If  there are significant abnormalities on your test results that warrant further action, you will be notified via MyChart or phone call, otherwise they will be addressed at your follow-up visit.    Prescriptions and Refill Requests:  General Prescriptions (e.g. prednisone, hydroxychloroquine, leflunomide, methotrexate, etc.) - These are sent to Retail Pharmacies, so all refill requests of these medications should be directed to your local pharmacy.  Specialty Prescriptions (e.g. Enbrel, Humira, Cosentyx, Xeljanz, etc.) - These are sent to Specialty Pharmacies, so all refill requests of these medications should be directed to your designated specialty pharmacy.  Infusion Prescriptions (e.g. Remicade, Simponi Aria, Rituxan, Saphnelo, etc.) - These are sent to Outpatient Infusion Centers, so all scheduling requests of these medications should be directed to your local infusion center.    Medication Risks and Adverse Effects:  Immunosuppressed Status - Steroids and antirheumatic drugs are immunosuppressants, so they increase the risk of infections and can have side effects on various organ systems in your body, though most of them are uncommon.  Potential Side Effects - Be sure to read the drug package inserts to learn about the potential side effects of your medications before you start taking them and take them exactly as prescribed unless instructed otherwise.  In Case of Side Effects - If you experience any significant side effects, stop taking the medication immediately and promptly notify the prescriber. Depending on the severity of the side effects, consider going to an Urgent Care or Emergency Department for immediate attention.    Immunizations and Health Screening:  Vaccinations - If you are on immunosuppressive therapy, it is important that you are up to date on age-appropriate immunizations, particularly shingles and pneumonia vaccines, which you can request from your primary care provider or from us at your  next appointment.  Screening Tests - It is also important that you are up to date on age-appropriate screening tests, such as pap smear, mammography, and colonoscopy, which you can request from your primary care provider.    Educational and Supportive Resources:  [employer] [facility] ([email or web address]) - Visit our website to learn more about your condition and other rheumatic diseases, and gain access to many helpful resources for them.  Disposal of Old Medications (www.artem.gov/everyday-takeback-day) - Visit the Drug Enforcement Administration website to find a nearby location where you can properly dispose of old medications you no longer need.  Disposal of Used [place] (www.[email or web address].org) - Visit the Safe Needle Disposal Organization website to find a nearby location where you can properly dispose of used needles from your injectable medications.
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
## 2024-11-23 DIAGNOSIS — M15.3 POST-TRAUMATIC OSTEOARTHRITIS OF MULTIPLE JOINTS: ICD-10-CM

## 2024-11-25 RX ORDER — CELECOXIB 100 MG/1
CAPSULE ORAL
Qty: 180 CAPSULE | Refills: 3 | Status: SHIPPED | OUTPATIENT
Start: 2024-11-25

## 2025-01-24 ASSESSMENT — RHEUMATOLOGY FOLLOW-UP QUESTIONNAIRE
COLD-INDUCED COLOR CHANGES (WHITE, PURPLE, RED ON REWARMING): FINGERS
BLURRY VISION: Y
SUNLIGHT-INDUCED SKIN RASH: ARMS
NAUSEA: Y
SUNLIGHT-INDUCED SKIN RASH: LEGS
SHORTNESS OF BREATH: Y
COLD-INDUCED COLOR CHANGES (WHITE, PURPLE, RED ON REWARMING): NOSE
SPASMS: Y
EYE REDNESS: Y
FEVERS: Y
RINGING IN EARS: Y
SUNLIGHT-INDUCED SKIN RASH: FACE
COLD-INDUCED COLOR CHANGES (WHITE, PURPLE, RED ON REWARMING): EARS
DRY EYES: Y
SUNLIGHT-INDUCED SKIN RASH: NECK
COLD-INDUCED COLOR CHANGES (WHITE, PURPLE, RED ON REWARMING): TOES

## 2025-01-27 ENCOUNTER — PHARMACY VISIT (OUTPATIENT)
Dept: PHARMACY | Facility: MEDICAL CENTER | Age: 65
End: 2025-01-27
Payer: COMMERCIAL

## 2025-01-27 ENCOUNTER — OFFICE VISIT (OUTPATIENT)
Dept: RHEUMATOLOGY | Facility: MEDICAL CENTER | Age: 65
End: 2025-01-27
Attending: STUDENT IN AN ORGANIZED HEALTH CARE EDUCATION/TRAINING PROGRAM
Payer: COMMERCIAL

## 2025-01-27 VITALS
HEIGHT: 68 IN | WEIGHT: 208.25 LBS | SYSTOLIC BLOOD PRESSURE: 128 MMHG | TEMPERATURE: 96.9 F | BODY MASS INDEX: 31.56 KG/M2 | DIASTOLIC BLOOD PRESSURE: 76 MMHG | OXYGEN SATURATION: 99 % | HEART RATE: 83 BPM

## 2025-01-27 DIAGNOSIS — L29.89 PRURITIC ERYTHEMATOUS RASH: ICD-10-CM

## 2025-01-27 DIAGNOSIS — D84.9 IMMUNOSUPPRESSED STATUS (HCC): ICD-10-CM

## 2025-01-27 DIAGNOSIS — E55.9 VITAMIN D INSUFFICIENCY: ICD-10-CM

## 2025-01-27 DIAGNOSIS — M15.3 POST-TRAUMATIC OSTEOARTHRITIS OF MULTIPLE JOINTS: ICD-10-CM

## 2025-01-27 DIAGNOSIS — D86.9 SARCOIDOSIS, UNSPECIFIED: ICD-10-CM

## 2025-01-27 PROBLEM — E83.52 HYPERCALCEMIA DUE TO SARCOIDOSIS: Status: RESOLVED | Noted: 2023-03-11 | Resolved: 2025-01-27

## 2025-01-27 PROCEDURE — RXMED WILLOW AMBULATORY MEDICATION CHARGE: Performed by: INTERNAL MEDICINE

## 2025-01-27 PROCEDURE — 3078F DIAST BP <80 MM HG: CPT | Performed by: STUDENT IN AN ORGANIZED HEALTH CARE EDUCATION/TRAINING PROGRAM

## 2025-01-27 PROCEDURE — 99212 OFFICE O/P EST SF 10 MIN: CPT | Performed by: STUDENT IN AN ORGANIZED HEALTH CARE EDUCATION/TRAINING PROGRAM

## 2025-01-27 PROCEDURE — 3074F SYST BP LT 130 MM HG: CPT | Performed by: STUDENT IN AN ORGANIZED HEALTH CARE EDUCATION/TRAINING PROGRAM

## 2025-01-27 PROCEDURE — 99214 OFFICE O/P EST MOD 30 MIN: CPT | Performed by: STUDENT IN AN ORGANIZED HEALTH CARE EDUCATION/TRAINING PROGRAM

## 2025-01-27 RX ORDER — ERGOCALCIFEROL 1.25 MG/1
50000 CAPSULE ORAL
Qty: 12 CAPSULE | Refills: 0 | Status: SHIPPED | OUTPATIENT
Start: 2025-01-27 | End: 2025-04-21

## 2025-01-27 ASSESSMENT — PATIENT HEALTH QUESTIONNAIRE - PHQ9: CLINICAL INTERPRETATION OF PHQ2 SCORE: 0

## 2025-01-27 NOTE — PATIENT INSTRUCTIONS
[facility]    Below are important guidelines to help you navigate your health care needs and assist us in caring for you safely and effectively. We encourage you to carefully read and understand this information and adhere to them accordingly.    [employer] Messaging and Phone Calls:  Diagnosis and Treatment - For a detailed explanation of your condition and treatment plan from today's visit, refer to the visit note on [employer] via the following steps:  Log in to [employer] and click on “Visits” at the top.  Scroll down to “Past Visits” under Appointments.  Click on “View Notes” under the appropriate visit date.  Questions or Concerns - MyChart messaging is for non-urgent matters that do not require immediate attention and should be brief with no more than two questions or concerns. If you have multiple questions or concerns, we ask that you schedule an appointment to have them properly addressed.  Response to Messages - [employer] messages are addressed throughout the week depending on clinical availability, so we ask that you allow up to one week for a response.  Phone Calls and Voicemails - Phone calls and voicemail messages are reserved for time-sensitive matters that cannot wait to be addressed via [employer]. We ask that you refrain from calling the office multiple times or leaving multiple voicemails regarding the same issue as doing so may lead to delays in response time.  Urgent Issues - For urgent medical matters or medical emergencies that cannot wait, you are advised to go to your nearest Urgent Care or Emergency Department for immediate attention.    Laboratory Tests and Imaging Studies:  Future Lab and Imaging Orders - We ask that you get your lab tests and imaging studies done no later than one week before your follow-up visit unless instructed otherwise.  Results Communication - You may see some test results marked as “abnormal” that are not necessarily significant or concerning. If  there are significant abnormalities on your test results that warrant further action, you will be notified via MyChart or phone call, otherwise they will be addressed at your follow-up visit.    Prescriptions and Refill Requests:  General Prescriptions (e.g. prednisone, hydroxychloroquine, leflunomide, methotrexate, etc.) - These are sent to Retail Pharmacies, so all refill requests of these medications should be directed to your local pharmacy.  Specialty Prescriptions (e.g. Enbrel, Humira, Cosentyx, Xeljanz, etc.) - These are sent to Specialty Pharmacies, so all refill requests of these medications should be directed to your designated specialty pharmacy.  Infusion Prescriptions (e.g. Remicade, Simponi Aria, Rituxan, Saphnelo, etc.) - These are sent to Outpatient Infusion Centers, so all scheduling requests of these medications should be directed to your local infusion center.    Medication Risks and Adverse Effects:  Immunosuppressed Status - Steroids and antirheumatic drugs are immunosuppressants, so they increase the risk of infections and can have side effects on various organ systems in your body, though most of them are uncommon.  Potential Side Effects - Be sure to read the drug package inserts to learn about the potential side effects of your medications before you start taking them and take them exactly as prescribed unless instructed otherwise.  In Case of Side Effects - If you experience any significant side effects, stop taking the medication immediately and promptly notify the prescriber. Depending on the severity of the side effects, consider going to an Urgent Care or Emergency Department for immediate attention.    Immunizations and Health Screening:  Vaccinations - If you are on immunosuppressive therapy, it is important that you are up to date on age-appropriate immunizations, particularly shingles and pneumonia vaccines, which you can request from your primary care provider or from us at your  next appointment.  Screening Tests - It is also important that you are up to date on age-appropriate screening tests, such as pap smear, mammography, and colonoscopy, which you can request from your primary care provider.    Educational and Supportive Resources:  [employer] Rheumatology ([email or web address]) - Visit our website to learn more about your condition and other rheumatic diseases, and gain access to many helpful resources for them.  Disposal of Old Medications (www.artem.gov/everyday-takeback-day) - Visit the Drug Enforcement Administration website to find a nearby location where you can properly dispose of old medications you no longer need.  Disposal of Used Carey (www.safeneedledisposal.org) - Visit the Safe Needle Disposal Organization website to find a nearby location where you can properly dispose of used needles from your injectable medications.
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
## 2025-02-18 DIAGNOSIS — M15.3 POST-TRAUMATIC OSTEOARTHRITIS OF MULTIPLE JOINTS: ICD-10-CM

## 2025-02-19 DIAGNOSIS — M15.3 POST-TRAUMATIC OSTEOARTHRITIS OF MULTIPLE JOINTS: ICD-10-CM

## 2025-02-19 RX ORDER — CELECOXIB 100 MG/1
100 CAPSULE ORAL
Qty: 60 CAPSULE | Refills: 5 | Status: SHIPPED | OUTPATIENT
Start: 2025-02-19 | End: 2025-02-20

## 2025-02-20 RX ORDER — CELECOXIB 100 MG/1
100 CAPSULE ORAL
Qty: 180 CAPSULE | Refills: 3 | Status: SHIPPED | OUTPATIENT
Start: 2025-02-20

## 2025-07-24 ASSESSMENT — RHEUMATOLOGY FOLLOW-UP QUESTIONNAIRE
SPASMS: Y
NUMBNESS: Y
BLURRY VISION: Y
SUNLIGHT-INDUCED SKIN RASH: FACE
RINGING IN EARS: Y
EYE REDNESS: Y
VOMITING: Y
SINUS PAIN: Y
DIFFICULTY SPEAKING: Y
DIZZINESS: Y
NOSEBLEEDS: Y
SUNLIGHT-INDUCED SKIN RASH: ARMS
WEAKNESS: Y
SHORTNESS OF BREATH: Y
NAUSEA: Y
HEARTBURN: Y
SUNLIGHT-INDUCED SKIN RASH: LEGS
FAST HEARTBEATS: Y
SKIN PLAQUES: Y
COLD-INDUCED COLOR CHANGES (WHITE, PURPLE, RED ON REWARMING): FINGERS
ABDOMINAL PAIN: Y
TINGLING: Y
COLD-INDUCED COLOR CHANGES (WHITE, PURPLE, RED ON REWARMING): TOES
DIFFICULTY SWALLOWING: Y
SUNLIGHT-INDUCED SKIN RASH: CHEST
SUNLIGHT-INDUCED SKIN RASH: NECK

## 2025-07-28 ENCOUNTER — OFFICE VISIT (OUTPATIENT)
Dept: RHEUMATOLOGY | Facility: MEDICAL CENTER | Age: 65
End: 2025-07-28
Attending: STUDENT IN AN ORGANIZED HEALTH CARE EDUCATION/TRAINING PROGRAM
Payer: COMMERCIAL

## 2025-07-28 VITALS
HEART RATE: 74 BPM | RESPIRATION RATE: 16 BRPM | DIASTOLIC BLOOD PRESSURE: 62 MMHG | SYSTOLIC BLOOD PRESSURE: 128 MMHG | WEIGHT: 207.2 LBS | HEIGHT: 68 IN | OXYGEN SATURATION: 100 % | BODY MASS INDEX: 31.4 KG/M2 | TEMPERATURE: 97.6 F

## 2025-07-28 DIAGNOSIS — D84.9 IMMUNOSUPPRESSED STATUS (HCC): ICD-10-CM

## 2025-07-28 DIAGNOSIS — D86.9 SARCOIDOSIS, UNSPECIFIED: Primary | ICD-10-CM

## 2025-07-28 DIAGNOSIS — E55.9 VITAMIN D INSUFFICIENCY: ICD-10-CM

## 2025-07-28 DIAGNOSIS — L29.89 PRURITIC ERYTHEMATOUS RASH: ICD-10-CM

## 2025-07-28 DIAGNOSIS — M15.3 POST-TRAUMATIC OSTEOARTHRITIS OF MULTIPLE JOINTS: ICD-10-CM

## 2025-07-28 PROCEDURE — 3074F SYST BP LT 130 MM HG: CPT | Performed by: STUDENT IN AN ORGANIZED HEALTH CARE EDUCATION/TRAINING PROGRAM

## 2025-07-28 PROCEDURE — 99213 OFFICE O/P EST LOW 20 MIN: CPT | Performed by: STUDENT IN AN ORGANIZED HEALTH CARE EDUCATION/TRAINING PROGRAM

## 2025-07-28 PROCEDURE — 3078F DIAST BP <80 MM HG: CPT | Performed by: STUDENT IN AN ORGANIZED HEALTH CARE EDUCATION/TRAINING PROGRAM

## 2025-07-28 PROCEDURE — 99214 OFFICE O/P EST MOD 30 MIN: CPT | Performed by: STUDENT IN AN ORGANIZED HEALTH CARE EDUCATION/TRAINING PROGRAM

## 2025-07-28 RX ORDER — HYDROXYZINE HYDROCHLORIDE 25 MG/1
TABLET, FILM COATED ORAL
COMMUNITY

## 2025-07-28 RX ORDER — ALBUTEROL SULFATE 90 UG/1
INHALANT RESPIRATORY (INHALATION)
COMMUNITY
Start: 2024-11-21

## 2025-07-28 RX ORDER — ERGOCALCIFEROL 1.25 MG/1
50000 CAPSULE ORAL
Qty: 12 CAPSULE | Refills: 3 | Status: SHIPPED | OUTPATIENT
Start: 2025-07-28

## 2025-07-28 NOTE — PATIENT INSTRUCTIONS
[facility]    Below are important guidelines to help you navigate your health care needs and assist us in caring for you safely and effectively. We encourage you to carefully read and understand this information and adhere to them accordingly.    [employer] Messaging and Phone Calls:  Diagnosis and Treatment - For a detailed explanation of your condition and treatment plan from today's visit, refer to the visit note on [employer] via the following steps:  Log in to [employer] and click on “Visits” at the top.  Scroll down to “Past Visits” under Appointments.  Click on “View Notes” under the appropriate visit date.  Questions or Concerns - MyChart messaging is for non-urgent matters that do not require immediate attention and should be brief with no more than two questions or concerns. If you have multiple questions or concerns, we ask that you schedule an appointment to have them properly addressed.  Response to Messages - [employer] messages are addressed throughout the week depending on clinical availability, so we ask that you allow up to one week for a response.  Phone Calls and Voicemails - Phone calls and voicemail messages are reserved for time-sensitive matters that cannot wait to be addressed via [employer]. We ask that you refrain from calling the office multiple times or leaving multiple voicemails regarding the same issue as doing so may lead to delays in response time.  Urgent Issues - For urgent medical matters or medical emergencies that cannot wait, you are advised to go to your nearest Urgent Care or Emergency Department for immediate attention.    Laboratory Tests and Imaging Studies:  Future Lab and Imaging Orders - We ask that you get your lab tests and imaging studies done no later than one week before your follow-up visit unless instructed otherwise.  Results Communication - You may see some test results marked as “abnormal” that are not necessarily significant or concerning. If  there are significant abnormalities on your test results that warrant further action, you will be notified via MyChart or phone call, otherwise they will be addressed at your follow-up visit.    Prescriptions and Refill Requests:  General Prescriptions (e.g. prednisone, hydroxychloroquine, leflunomide, methotrexate, etc.) - These are sent to Retail Pharmacies, so all refill requests of these medications should be directed to your local pharmacy.  Specialty Prescriptions (e.g. Enbrel, Humira, Cosentyx, Xeljanz, etc.) - These are sent to Specialty Pharmacies, so all refill requests of these medications should be directed to your designated specialty pharmacy.  Infusion Prescriptions (e.g. Remicade, Simponi Aria, Rituxan, Saphnelo, etc.) - These are sent to Outpatient Infusion Centers, so all scheduling requests of these medications should be directed to your local infusion center.    Medication Risks and Adverse Effects:  Immunosuppressed Status - Steroids and antirheumatic drugs are immunosuppressants, so they increase the risk of infections and can have side effects on various organ systems in your body, though most of them are uncommon.  Potential Side Effects - Be sure to read the drug package inserts to learn about the potential side effects of your medications before you start taking them and take them exactly as prescribed unless instructed otherwise.  In Case of Side Effects - If you experience any significant side effects, stop taking the medication immediately and promptly notify the prescriber. Depending on the severity of the side effects, consider going to an Urgent Care or Emergency Department for immediate attention.    Immunizations and Health Screening:  Vaccinations - If you are on immunosuppressive therapy, it is important that you are up to date on age-appropriate immunizations, particularly shingles and pneumonia vaccines, which you can request from your primary care provider or from us at your  next appointment.  Screening Tests - It is also important that you are up to date on age-appropriate screening tests, such as pap smear, mammography, and colonoscopy, which you can request from your primary care provider.    Educational and Supportive Resources:  [facility] ([email or web address]) - Visit our website to learn more about your condition and other rheumatic diseases, and gain access to many helpful resources for them.  Disposal of Old Medications (www.artem.gov/everyday-takeback-day) - Visit the Drug Enforcement Administration website to find a nearby location where you can properly dispose of old medications you no longer need.  Disposal of Used Gaylord (www.safeneedledisposal.org) - Visit the Safe Needle Disposal Organization website to find a nearby location where you can properly dispose of used needles from your injectable medications.
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
## 2025-08-23 DIAGNOSIS — M15.3 POST-TRAUMATIC OSTEOARTHRITIS OF MULTIPLE JOINTS: ICD-10-CM

## 2025-08-26 RX ORDER — CELECOXIB 100 MG/1
CAPSULE ORAL
Qty: 60 CAPSULE | Refills: 1 | Status: SHIPPED | OUTPATIENT
Start: 2025-08-26